# Patient Record
Sex: FEMALE | URBAN - METROPOLITAN AREA
[De-identification: names, ages, dates, MRNs, and addresses within clinical notes are randomized per-mention and may not be internally consistent; named-entity substitution may affect disease eponyms.]

---

## 2023-06-22 ENCOUNTER — APPOINTMENT (OUTPATIENT)
Dept: URBAN - METROPOLITAN AREA CLINIC 307 | Age: 83
Setting detail: DERMATOLOGY
End: 2023-06-22

## 2023-06-22 DIAGNOSIS — R21 RASH AND OTHER NONSPECIFIC SKIN ERUPTION: ICD-10-CM

## 2023-06-22 PROCEDURE — 11104 PUNCH BX SKIN SINGLE LESION: CPT

## 2023-06-22 PROCEDURE — 11105 PUNCH BX SKIN EA SEP/ADDL: CPT

## 2023-06-22 PROCEDURE — OTHER ORDER TESTS: OTHER

## 2023-06-22 PROCEDURE — OTHER COUNSELING: OTHER

## 2023-06-22 PROCEDURE — OTHER BIOPSY BY PUNCH METHOD: OTHER

## 2023-06-22 PROCEDURE — OTHER PRESCRIPTION MEDICATION MANAGEMENT: OTHER

## 2023-06-22 PROCEDURE — OTHER MIPS QUALITY: OTHER

## 2023-06-22 PROCEDURE — OTHER PRESCRIPTION: OTHER

## 2023-06-22 RX ORDER — CLOBETASOL PROPIONATE 0.5 MG/G
OINTMENT TOPICAL
Qty: 60 | Refills: 2 | Status: ERX | COMMUNITY
Start: 2023-06-22

## 2023-06-22 ASSESSMENT — LOCATION DETAILED DESCRIPTION DERM
LOCATION DETAILED: UPPER STERNUM
LOCATION DETAILED: RIGHT PROXIMAL DORSAL FOREARM
LOCATION DETAILED: LEFT SUPRAPUBIC SKIN
LOCATION DETAILED: LEFT DISTAL DORSAL FOREARM
LOCATION DETAILED: RIGHT MID-UPPER BACK
LOCATION DETAILED: RIGHT DISTAL DORSAL FOREARM
LOCATION DETAILED: LEFT VENTRAL PROXIMAL FOREARM
LOCATION DETAILED: LEFT ANTERIOR DISTAL UPPER ARM
LOCATION DETAILED: RIGHT ANTERIOR DISTAL UPPER ARM

## 2023-06-22 ASSESSMENT — LOCATION SIMPLE DESCRIPTION DERM
LOCATION SIMPLE: LEFT UPPER ARM
LOCATION SIMPLE: RIGHT FOREARM
LOCATION SIMPLE: GROIN
LOCATION SIMPLE: CHEST
LOCATION SIMPLE: LEFT FOREARM
LOCATION SIMPLE: RIGHT UPPER ARM
LOCATION SIMPLE: RIGHT UPPER BACK

## 2023-06-22 ASSESSMENT — LOCATION ZONE DERM
LOCATION ZONE: ARM
LOCATION ZONE: TRUNK

## 2023-06-22 NOTE — PROCEDURE: PRESCRIPTION MEDICATION MANAGEMENT
Modify Regimen: May use triamcinolone 0.025% very thinly to inguinal area if needed
Detail Level: Zone
Initiate Treatment: clobetasol 0.05 % topical ointment very thinly bid
Render In Strict Bullet Format?: No
Samples Given: Eucerin eczema relief cream and eczema body wash\\nCerave anti itch lotion
Plan: Take Zyrtec 10mg up to 2 po bid as tolerated. Benadryl ok to continue prn.\\nWill refrain from any oral steroids due to interaction with keytruda.

## 2023-06-22 NOTE — HPI: RASH
Additional History: She went to the ER for this rash last week and received IV steroids. Was given triamcinolone 0.025%, clotrimazole/betamethasone cr, and medrol dose pack (which she has finished).\\nShe denies any new medications or otc meds. 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 She is under care of oncologist Chris Zapata in [place], next infusion due July 28. States she receives labs q6wks. Took Inlyta for a short period of time last year but hasn’t had this in 2023.\\nShe otherwise states she is healthy. No recent illnesses. No changes in products. No contacts at home with rash. \\nStates it first appeared on her chest before spreading to arms, hands, upper back, and thighs.\\nDenies eruption to feet or scalp, or skin folds. She has recently started itching to inguinal folds and notices new redness to this area but no similar rash/bumps. Itching is severe and persistent throughout the day, no worse at night. Additional History: She went to the ER for this rash last week and received IV steroids. Was given triamcinolone 0.025%, clotrimazole/betamethasone cr, and medrol dose pack (which she has finished).\\nShe denies any new medications or otc meds. 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 She is under care of oncologist Chris Zapata in [place], next infusion due July 28. States she receives labs q6wks. Took Inlyta for a short period of time last year but hasn’t had this in 2023.\\nShe otherwise states she is healthy. No recent illnesses. No changes in products. No contacts at home with rash. \\nStates it first appeared on her chest before spreading to arms, hands, upper back, and thighs.\\nDenies eruption to feet or scalp, or skin folds. She has recently started itching to inguinal folds and notices new redness to this area but no similar rash/bumps. Itching is severe and persistent throughout the day, no worse at night.

## 2023-07-03 ENCOUNTER — APPOINTMENT (OUTPATIENT)
Dept: URBAN - METROPOLITAN AREA CLINIC 303 | Age: 83
Setting detail: DERMATOLOGY
End: 2023-07-03

## 2023-07-03 DIAGNOSIS — L81.0 POSTINFLAMMATORY HYPERPIGMENTATION: ICD-10-CM

## 2023-07-03 DIAGNOSIS — L20.84 INTRINSIC (ALLERGIC) ECZEMA: ICD-10-CM

## 2023-07-03 PROBLEM — L30.9 DERMATITIS, UNSPECIFIED: Status: ACTIVE | Noted: 2023-07-03

## 2023-07-03 PROCEDURE — 99213 OFFICE O/P EST LOW 20 MIN: CPT

## 2023-07-03 PROCEDURE — OTHER COUNSELING: OTHER

## 2023-07-03 PROCEDURE — OTHER ADDITIONAL NOTES: OTHER

## 2023-07-03 PROCEDURE — OTHER PRESCRIPTION MEDICATION MANAGEMENT: OTHER

## 2023-07-03 ASSESSMENT — LOCATION ZONE DERM
LOCATION ZONE: ARM
LOCATION ZONE: TRUNK
LOCATION ZONE: LEG

## 2023-07-03 ASSESSMENT — LOCATION DETAILED DESCRIPTION DERM
LOCATION DETAILED: SUPERIOR THORACIC SPINE
LOCATION DETAILED: LEFT PROXIMAL DORSAL FOREARM
LOCATION DETAILED: RIGHT DISTAL DORSAL FOREARM
LOCATION DETAILED: UPPER STERNUM
LOCATION DETAILED: RIGHT ANTERIOR PROXIMAL THIGH
LOCATION DETAILED: LEFT ANTERIOR DISTAL THIGH

## 2023-07-03 ASSESSMENT — LOCATION SIMPLE DESCRIPTION DERM
LOCATION SIMPLE: LEFT FOREARM
LOCATION SIMPLE: UPPER BACK
LOCATION SIMPLE: RIGHT THIGH
LOCATION SIMPLE: RIGHT FOREARM
LOCATION SIMPLE: LEFT THIGH
LOCATION SIMPLE: CHEST

## 2023-07-03 NOTE — PROCEDURE: ADDITIONAL NOTES
Render Risk Assessment In Note?: no
Additional Notes: Will fax notes and path to Dr Zapata oncologist.
Detail Level: Simple

## 2023-07-03 NOTE — PROCEDURE: PRESCRIPTION MEDICATION MANAGEMENT
Render In Strict Bullet Format?: No
Detail Level: Zone
Continue Regimen: Clobetasol cream thinly bid prn to more severe areas to extremities\\nTriamcinolone cream thinly bid to thin skin regions (groin)\\nZyrtec 2 po bid, and Benadryl prn\\nEucerin eczema cream relief BID on damp skin (stock size supply given)